# Patient Record
Sex: MALE | Race: WHITE | Employment: FULL TIME | URBAN - METROPOLITAN AREA
[De-identification: names, ages, dates, MRNs, and addresses within clinical notes are randomized per-mention and may not be internally consistent; named-entity substitution may affect disease eponyms.]

---

## 2018-06-01 DIAGNOSIS — F41.9 ANXIETY: ICD-10-CM

## 2018-06-01 RX ORDER — CLONAZEPAM 1 MG/1
TABLET ORAL
Qty: 60 TABLET | Refills: 2 | Status: SHIPPED | OUTPATIENT
Start: 2018-06-01 | End: 2018-08-28 | Stop reason: SDUPTHER

## 2018-08-13 ENCOUNTER — OFFICE VISIT (OUTPATIENT)
Dept: FAMILY MEDICINE CLINIC | Age: 38
End: 2018-08-13
Payer: COMMERCIAL

## 2018-08-13 VITALS
WEIGHT: 217 LBS | DIASTOLIC BLOOD PRESSURE: 80 MMHG | OXYGEN SATURATION: 97 % | TEMPERATURE: 98 F | BODY MASS INDEX: 29.43 KG/M2 | HEART RATE: 84 BPM | SYSTOLIC BLOOD PRESSURE: 128 MMHG

## 2018-08-13 DIAGNOSIS — F17.210 CIGARETTE NICOTINE DEPENDENCE WITHOUT COMPLICATION: ICD-10-CM

## 2018-08-13 DIAGNOSIS — F41.9 ANXIETY: ICD-10-CM

## 2018-08-13 DIAGNOSIS — Z00.00 ENCOUNTER FOR WELL ADULT EXAM WITHOUT ABNORMAL FINDINGS: Primary | ICD-10-CM

## 2018-08-13 PROCEDURE — 99406 BEHAV CHNG SMOKING 3-10 MIN: CPT | Performed by: FAMILY MEDICINE

## 2018-08-13 PROCEDURE — 99214 OFFICE O/P EST MOD 30 MIN: CPT | Performed by: FAMILY MEDICINE

## 2018-08-13 ASSESSMENT — PATIENT HEALTH QUESTIONNAIRE - PHQ9
SUM OF ALL RESPONSES TO PHQ9 QUESTIONS 1 & 2: 0
1. LITTLE INTEREST OR PLEASURE IN DOING THINGS: 0
SUM OF ALL RESPONSES TO PHQ QUESTIONS 1-9: 0
SUM OF ALL RESPONSES TO PHQ QUESTIONS 1-9: 0
2. FEELING DOWN, DEPRESSED OR HOPELESS: 0

## 2018-08-13 NOTE — PROGRESS NOTES
pain, palpitations, anginal symptoms. We discussed multiple things to help him stop smoking including nicotine patches (which she has tried before unsuccessfully), Chantix, Zyban. He is interested in Chantix. He is going to check with his insurance company. We spent greater than 3 but less than 10 minutes today discussing smoking cessation. Family History   Problem Relation Age of Onset    Diabetes Father     High Cholesterol Father        Past Surgical History:   Procedure Laterality Date    ACHILLES TENDON SURGERY      HAND SURGERY      Right 5th metacrpal plate    TONSILLECTOMY AND ADENOIDECTOMY         Social History   Substance Use Topics    Smoking status: Current Every Day Smoker     Packs/day: 0.50     Types: Cigarettes    Smokeless tobacco: Never Used    Alcohol use No   Works as  for "[employer]s. ROS:  No CP, No palpitations,   No sob, No cough,   No abd pain, No heartburn,   No headaches,   No tingling, No numbness, No weakness,   No bowel changes, No hematochezia, No melena,  No bladder changes, No hematuria  No skin rashes, No skin lesions. No vision changes, No hearing changes,   No polyuria, polydipsia, polyphagia. Stable mood. Sometimes anxious. ROS otherwise negative unless as listed in HPI. Chart reviewed and updated where appropriate for PMH, Fam, and Soc Hx. Physical Exam   /80 (Site: Right Arm, Position: Sitting, Cuff Size: Medium Adult)   Pulse 84   Temp 98 °F (36.7 °C) (Oral)   Wt 217 lb (98.4 kg)   SpO2 97%   BMI 29.43 kg/m²   Wt Readings from Last 3 Encounters:   08/13/18 217 lb (98.4 kg)   02/05/18 214 lb (97.1 kg)   07/17/17 218 lb 4.8 oz (99 kg)       Constitutional:    He is oriented to person, place, and time. He appears well-developed and well-nourished.    HENT:    Right Ear: Tympanic membrane, external ear and ear canal normal.    Left Ear: Tympanic membrane, external ear and ear canal normal.    Nose: Nose normal. Mouth/Throat: Oropharynx is clear and moist.   Eyes:    Conjunctivae are normal.    Pupils are equal, round, and reactive to light. EOMI. Neck:    Normal range of motion. No thyromegaly or nodules noted. No bruit. Cardiovascular:    Normal rate, regular rhythm and normal heart sounds. No murmur. No gallop and no friction rub. Pulmonary/Chest:    Effort normal and breath sounds normal.    No wheezes. No rales or rhonchi. Abdominal:    Soft. Bowel sounds are normal.    No distension. No tenderness. Musculoskeletal:    Normal range of motion. No joint swelling noted. No peripheral edema. Skin:    Skin is warm and dry. No rashes, lesions. Psychiatric:    He has a normal mood, sometimes anxious, and affect. Normal groom and dress. Current Outpatient Prescriptions on File Prior to Visit   Medication Sig Dispense Refill    clonazePAM (KLONOPIN) 1 MG tablet take 1 tablet by mouth twice a day if needed for anxiety. . 60 tablet 2    salicylic acid-lactic acid (COMPOUND W) 17 % external solution Apply topically. 9 mL 0    benzoyl peroxide 5 % gel Apply topically daily. 42.5 g 0     No current facility-administered medications on file prior to visit. Patient Active Problem List   Diagnosis Code    Anxiety F41.9       Assessment / Sejal Covert was seen today for annual exam.    Diagnoses and all orders for this visit:    Encounter for well adult exam without abnormal findings  -     CBC Auto Differential; Future  -     Comprehensive Metabolic Panel; Future  -     Lipid Panel; Future    Anxiety  OARRS report reviewed and consistent. As in the HPI. Stable, generally controlled. Continue same Klonopin dose . Currently struggling in his relationship with his wife. He is making some decisions about how he wants to proceed that way. Cigarette nicotine dependence without complication  Greater than 3 but less than 10 minutes was spent discussing nicotine cessation.   He will

## 2018-08-28 DIAGNOSIS — F41.9 ANXIETY: ICD-10-CM

## 2018-08-30 RX ORDER — CLONAZEPAM 1 MG/1
TABLET ORAL
Qty: 60 TABLET | Refills: 2 | Status: SHIPPED | OUTPATIENT
Start: 2018-08-30 | End: 2018-11-15 | Stop reason: CLARIF

## 2018-09-20 ENCOUNTER — TELEPHONE (OUTPATIENT)
Dept: FAMILY MEDICINE CLINIC | Age: 38
End: 2018-09-20

## 2018-11-15 ENCOUNTER — HOSPITAL ENCOUNTER (OUTPATIENT)
Age: 38
Discharge: HOME | End: 2018-11-17
Payer: COMMERCIAL

## 2018-11-15 ENCOUNTER — OFFICE VISIT (OUTPATIENT)
Dept: FAMILY MEDICINE CLINIC | Age: 38
End: 2018-11-15
Payer: COMMERCIAL

## 2018-11-15 VITALS
SYSTOLIC BLOOD PRESSURE: 118 MMHG | BODY MASS INDEX: 30.24 KG/M2 | DIASTOLIC BLOOD PRESSURE: 78 MMHG | OXYGEN SATURATION: 95 % | HEART RATE: 68 BPM | WEIGHT: 223 LBS | TEMPERATURE: 98.4 F

## 2018-11-15 DIAGNOSIS — F41.9 ANXIETY: Primary | ICD-10-CM

## 2018-11-15 LAB
AMPHETAMINE SCREEN, URINE: NOT DETECTED
BARBITURATE SCREEN URINE: NOT DETECTED
BENZODIAZEPINE SCREEN, URINE: NOT DETECTED
CANNABINOID SCREEN URINE: NOT DETECTED
COCAINE METABOLITE SCREEN URINE: NOT DETECTED
METHADONE SCREEN, URINE: NOT DETECTED
OPIATE SCREEN URINE: NOT DETECTED
PHENCYCLIDINE SCREEN URINE: NOT DETECTED
PROPOXYPHENE SCREEN: NOT DETECTED

## 2018-11-15 PROCEDURE — G0480 DRUG TEST DEF 1-7 CLASSES: HCPCS

## 2018-11-15 PROCEDURE — 80307 DRUG TEST PRSMV CHEM ANLYZR: CPT

## 2018-11-15 PROCEDURE — 99213 OFFICE O/P EST LOW 20 MIN: CPT | Performed by: FAMILY MEDICINE

## 2018-11-15 RX ORDER — CLONAZEPAM 1 MG/1
1 TABLET ORAL 2 TIMES DAILY PRN
Refills: 0 | COMMUNITY
Start: 2018-11-01 | End: 2018-11-30 | Stop reason: SDUPTHER

## 2018-11-15 NOTE — PROGRESS NOTES
[facility]  Office Progress Note - Dr. Alexander Muñoz  11/15/18    Chief Complaint   Patient presents with    Anxiety     3 mo f/u        S: Anxiety  Feeling well recently. Work is going ok, but becomes tighter at this time of year. Taking Klonopin 0.5mg QID most recently. Feels like there is less grogginess and more at a constant throughout the day when he takes it like this. He is not feeling an increased need to use it. He is not noting that it is less effective. Urine drug screens have been consistent. Screening test has occasionally needed to have been confirmed by quantitative testing, but medication has been present. OARRS reports have been reviewed and consistent. Not currently available for review today. We discussed issues of tolerance, dependence, and addiction. Patient continues to do well and wishes to continue therapeutic regimen. I think that this is appropriate. We have not increased his dose over time. Continue smoking. Contemplative but not ready to commit. Encouraged him to have some blood work done. He is concerned about cost.      Family History   Problem Relation Age of Onset    Diabetes Father     High Cholesterol Father        Past Surgical History:   Procedure Laterality Date    ACHILLES TENDON SURGERY      HAND SURGERY      Right 5th metacrpal plate    TONSILLECTOMY AND ADENOIDECTOMY         Social History   Substance Use Topics    Smoking status: Current Every Day Smoker     Packs/day: 0.75     Types: Cigarettes    Smokeless tobacco: Never Used    Alcohol use No       ROS:  No CP, No palpitations,   No sob, No cough,   No abd pain, No heartburn,   No headaches,   No tingling, No numbness, No weakness,   No bowel changes, No hematochezia, No melena,  No bladder changes, No hematuria  No skin rashes, No skin lesions. No vision changes, No hearing changes,   No polyuria, polydipsia, polyphagia. Stable mood.   ROS otherwise negative unless as listed months (around 5/15/2019). Patient counseled to follow up sooner or seek more acute care if symptoms worsening. Electronically signed by Remigio Montez MD on 11/15/2018    This note may have been created using dictation software.  Efforts were made to reduce grammatical or syntax errors, but some may persist.

## 2018-11-20 LAB
7-AMINOCLONAZEPAM, URINE: 497 NG/ML
ALPHA-HYDROXYALPRAZOLAM, URINE: <5 NG/ML
ALPHA-HYDROXYMIDAZOLAM, URINE: <20 NG/ML
ALPRAZOLAM, URINE: <5 NG/ML
CHLORDIAZEPOXIDE, URINE: <20 NG/ML
CLONAZEPAM, URINE: 22 NG/ML
DIAZEPAM, URINE: <20 NG/ML
LORAZEPAM, URINE: <20 NG/ML
MIDAZOLAM, URINE: <20 NG/ML
NORDIAZEPAM, URINE: <20 NG/ML
OXAZEPAM, URINE: <20 NG/ML
TEMAZEPAM, URINE: <20 NG/ML

## 2018-11-30 DIAGNOSIS — F41.9 ANXIETY: Primary | ICD-10-CM

## 2018-11-30 RX ORDER — CLONAZEPAM 1 MG/1
1 TABLET ORAL 2 TIMES DAILY PRN
Qty: 60 TABLET | Refills: 0 | Status: SHIPPED | OUTPATIENT
Start: 2018-11-30 | End: 2019-01-03 | Stop reason: SDUPTHER

## 2018-12-10 ENCOUNTER — TELEPHONE (OUTPATIENT)
Dept: FAMILY MEDICINE CLINIC | Age: 38
End: 2018-12-10

## 2019-01-02 DIAGNOSIS — F41.9 ANXIETY: ICD-10-CM

## 2019-01-02 RX ORDER — CLONAZEPAM 1 MG/1
1 TABLET ORAL 2 TIMES DAILY PRN
Qty: 60 TABLET | Refills: 0 | Status: CANCELLED | OUTPATIENT
Start: 2019-01-02 | End: 2019-02-01

## 2019-01-03 DIAGNOSIS — F41.9 ANXIETY: ICD-10-CM

## 2019-01-04 RX ORDER — CLONAZEPAM 1 MG/1
1 TABLET ORAL 2 TIMES DAILY PRN
Qty: 60 TABLET | Refills: 0 | Status: SHIPPED | OUTPATIENT
Start: 2019-01-04 | End: 2019-02-01 | Stop reason: SDUPTHER

## 2019-02-01 DIAGNOSIS — F41.9 ANXIETY: ICD-10-CM

## 2019-02-04 RX ORDER — CLONAZEPAM 1 MG/1
1 TABLET ORAL 2 TIMES DAILY PRN
Qty: 60 TABLET | Refills: 2 | Status: SHIPPED | OUTPATIENT
Start: 2019-02-04 | End: 2019-04-29 | Stop reason: SDUPTHER

## 2019-02-08 ENCOUNTER — TELEPHONE (OUTPATIENT)
Dept: FAMILY MEDICINE CLINIC | Age: 39
End: 2019-02-08

## 2019-03-22 ENCOUNTER — TELEPHONE (OUTPATIENT)
Dept: FAMILY MEDICINE CLINIC | Age: 39
End: 2019-03-22

## 2019-04-18 DIAGNOSIS — F41.9 ANXIETY: ICD-10-CM

## 2019-04-18 RX ORDER — CLONAZEPAM 1 MG/1
1 TABLET ORAL 2 TIMES DAILY PRN
Qty: 60 TABLET | Refills: 2 | Status: CANCELLED | OUTPATIENT
Start: 2019-04-18 | End: 2019-05-18

## 2019-04-29 DIAGNOSIS — F41.9 ANXIETY: ICD-10-CM

## 2019-04-29 RX ORDER — CLONAZEPAM 1 MG/1
1 TABLET ORAL 2 TIMES DAILY PRN
Qty: 60 TABLET | Refills: 2 | Status: SHIPPED | OUTPATIENT
Start: 2019-05-02 | End: 2019-08-02

## 2019-06-12 ENCOUNTER — TELEPHONE (OUTPATIENT)
Dept: FAMILY MEDICINE CLINIC | Age: 39
End: 2019-06-12

## 2019-06-24 ENCOUNTER — TELEPHONE (OUTPATIENT)
Dept: ADMINISTRATIVE | Age: 39
End: 2019-06-24

## 2019-08-02 ENCOUNTER — OFFICE VISIT (OUTPATIENT)
Dept: FAMILY MEDICINE CLINIC | Age: 39
End: 2019-08-02
Payer: COMMERCIAL

## 2019-08-02 ENCOUNTER — HOSPITAL ENCOUNTER (OUTPATIENT)
Age: 39
Discharge: HOME | End: 2019-08-04
Payer: COMMERCIAL

## 2019-08-02 VITALS
HEIGHT: 72 IN | TEMPERATURE: 97.9 F | DIASTOLIC BLOOD PRESSURE: 72 MMHG | OXYGEN SATURATION: 98 % | SYSTOLIC BLOOD PRESSURE: 120 MMHG | BODY MASS INDEX: 29.12 KG/M2 | WEIGHT: 215 LBS | HEART RATE: 79 BPM

## 2019-08-02 DIAGNOSIS — F41.9 ANXIETY: ICD-10-CM

## 2019-08-02 DIAGNOSIS — F41.9 ANXIETY: Primary | ICD-10-CM

## 2019-08-02 PROCEDURE — G0480 DRUG TEST DEF 1-7 CLASSES: HCPCS

## 2019-08-02 PROCEDURE — 80307 DRUG TEST PRSMV CHEM ANLYZR: CPT

## 2019-08-02 PROCEDURE — 99213 OFFICE O/P EST LOW 20 MIN: CPT | Performed by: FAMILY MEDICINE

## 2019-08-02 RX ORDER — CLONAZEPAM 1 MG/1
1 TABLET ORAL 2 TIMES DAILY PRN
Qty: 60 TABLET | Refills: 2 | Status: SHIPPED | OUTPATIENT
Start: 2019-08-02 | End: 2019-10-30 | Stop reason: SDUPTHER

## 2019-08-02 ASSESSMENT — PATIENT HEALTH QUESTIONNAIRE - PHQ9
SUM OF ALL RESPONSES TO PHQ QUESTIONS 1-9: 0
2. FEELING DOWN, DEPRESSED OR HOPELESS: 0
SUM OF ALL RESPONSES TO PHQ QUESTIONS 1-9: 0
SUM OF ALL RESPONSES TO PHQ9 QUESTIONS 1 & 2: 0
1. LITTLE INTEREST OR PLEASURE IN DOING THINGS: 0

## 2019-08-02 NOTE — PROGRESS NOTES
[facility]  Office Progress Note - Dr. Cristiano Leggett  8/2/19    Chief Complaint   Patient presents with    Anxiety    Medication Refill        S: Anx check in  Feeling very well lately  Currently  from wife for about 3 months. Has custody of his son which is also a relief. Work going well, busy. Anxiety has been \"good\" in the beginning with separation was a little rough but doesn't feel like overwhelming. Continues . 5mg in morning, .5 mg noon and 1mg before bedtime. Oarrs report reviewed and consistent. Patient has continued clonazepam 1 mg twice daily for years. Urine drug screens recently consistent. Confirmatory testing has shown residence of appropriate metabolites. Sometimes the screening test is not sensitive enough. Feeing tired more lately  Having some caffeine during the day seems to help, but doesn't want to do that  Sleeps about 8 hours nightly, decently well. Family History   Problem Relation Age of Onset    Diabetes Father     High Cholesterol Father        Past Surgical History:   Procedure Laterality Date    ACHILLES TENDON SURGERY      HAND SURGERY      Right 5th metacrpal plate    TONSILLECTOMY AND ADENOIDECTOMY         Social History     Tobacco Use    Smoking status: Current Every Day Smoker     Packs/day: 0.75     Types: Cigarettes    Smokeless tobacco: Never Used   Substance Use Topics    Alcohol use: No     Alcohol/week: 0.0 standard drinks    Drug use: Not on file       ROS:  No CP, No palpitations,   No sob, No cough,   No abd pain, No heartburn,   No headaches,   No tingling, No numbness, No weakness,   No bowel changes, No hematochezia, No melena,  No bladder changes, No hematuria  No skin rashes, No skin lesions. No vision changes, No hearing changes,   No polyuria, polydipsia, polyphagia. Stable mood. ROS otherwise negative unless as listed in HPI.     Chart reviewed and updated where appropriate for PMH, Fam, and Soc

## 2019-08-08 LAB
7-AMINOCLONAZEPAM, URINE: 273 NG/ML
ALPHA-HYDROXYALPRAZOLAM, URINE: <5 NG/ML
ALPHA-HYDROXYMIDAZOLAM, URINE: <20 NG/ML
ALPRAZOLAM, URINE: <5 NG/ML
CHLORDIAZEPOXIDE, URINE: <20 NG/ML
CLONAZEPAM, URINE: 7 NG/ML
DIAZEPAM, URINE: <20 NG/ML
LORAZEPAM, URINE: <20 NG/ML
MIDAZOLAM, URINE: <20 NG/ML
NORDIAZEPAM, URINE: <20 NG/ML
OXAZEPAM, URINE: <20 NG/ML
TEMAZEPAM, URINE: <20 NG/ML

## 2019-10-29 DIAGNOSIS — F41.9 ANXIETY: ICD-10-CM

## 2019-10-30 RX ORDER — CLONAZEPAM 1 MG/1
1 TABLET ORAL 2 TIMES DAILY PRN
Qty: 60 TABLET | Refills: 2 | Status: SHIPPED | OUTPATIENT
Start: 2019-10-30 | End: 2020-01-24 | Stop reason: SDUPTHER

## 2019-12-13 ENCOUNTER — OFFICE VISIT (OUTPATIENT)
Dept: FAMILY MEDICINE CLINIC | Age: 39
End: 2019-12-13
Payer: COMMERCIAL

## 2019-12-13 VITALS
WEIGHT: 218 LBS | OXYGEN SATURATION: 98 % | TEMPERATURE: 97.4 F | HEIGHT: 72 IN | SYSTOLIC BLOOD PRESSURE: 104 MMHG | DIASTOLIC BLOOD PRESSURE: 60 MMHG | BODY MASS INDEX: 29.53 KG/M2 | HEART RATE: 73 BPM

## 2019-12-13 DIAGNOSIS — F41.9 ANXIETY: Primary | ICD-10-CM

## 2019-12-13 PROCEDURE — 99214 OFFICE O/P EST MOD 30 MIN: CPT | Performed by: FAMILY MEDICINE

## 2020-01-28 RX ORDER — CLONAZEPAM 1 MG/1
1 TABLET ORAL 2 TIMES DAILY PRN
Qty: 60 TABLET | Refills: 2 | Status: SHIPPED
Start: 2020-01-28 | End: 2020-04-16 | Stop reason: SDUPTHER

## 2020-02-20 ENCOUNTER — TELEPHONE (OUTPATIENT)
Dept: FAMILY MEDICINE CLINIC | Age: 40
End: 2020-02-20

## 2020-04-16 ENCOUNTER — TELEPHONE (OUTPATIENT)
Dept: FAMILY MEDICINE CLINIC | Age: 40
End: 2020-04-16

## 2020-04-16 RX ORDER — CLONAZEPAM 1 MG/1
1 TABLET ORAL 2 TIMES DAILY PRN
Qty: 60 TABLET | Refills: 1 | Status: SHIPPED
Start: 2020-04-16 | End: 2020-06-10 | Stop reason: SDUPTHER

## 2020-04-16 NOTE — TELEPHONE ENCOUNTER
Last Appointment:  12/13/2019  Future Appointments   Date Time Provider Coreen Avilesi   6/3/2020  2:40 PM [id number]  He is due to fill on the 25th

## 2020-06-10 ENCOUNTER — OFFICE VISIT (OUTPATIENT)
Dept: FAMILY MEDICINE CLINIC | Age: 40
End: 2020-06-10

## 2020-06-10 ENCOUNTER — HOSPITAL ENCOUNTER (OUTPATIENT)
Age: 40
Discharge: HOME | End: 2020-06-12

## 2020-06-10 VITALS
SYSTOLIC BLOOD PRESSURE: 138 MMHG | HEIGHT: 72 IN | DIASTOLIC BLOOD PRESSURE: 84 MMHG | TEMPERATURE: 98.2 F | BODY MASS INDEX: 30.31 KG/M2 | HEART RATE: 88 BPM | WEIGHT: 223.8 LBS | OXYGEN SATURATION: 98 %

## 2020-06-10 LAB
AMPHETAMINE SCREEN, URINE: NOT DETECTED
BARBITURATE SCREEN URINE: NOT DETECTED
BENZODIAZEPINE SCREEN, URINE: NOT DETECTED
CANNABINOID SCREEN URINE: NOT DETECTED
COCAINE METABOLITE SCREEN URINE: NOT DETECTED
FENTANYL SCREEN, URINE: NOT DETECTED
Lab: NORMAL
METHADONE SCREEN, URINE: NOT DETECTED
OPIATE SCREEN URINE: NOT DETECTED
OXYCODONE URINE: NOT DETECTED
PHENCYCLIDINE SCREEN URINE: NOT DETECTED

## 2020-06-10 PROCEDURE — 80307 DRUG TEST PRSMV CHEM ANLYZR: CPT

## 2020-06-10 PROCEDURE — 99213 OFFICE O/P EST LOW 20 MIN: CPT | Performed by: FAMILY MEDICINE

## 2020-06-10 PROCEDURE — G0480 DRUG TEST DEF 1-7 CLASSES: HCPCS

## 2020-06-10 RX ORDER — CLONAZEPAM 1 MG/1
1 TABLET ORAL 2 TIMES DAILY PRN
Qty: 60 TABLET | Refills: 2 | Status: SHIPPED
Start: 2020-06-10 | End: 2020-09-09

## 2020-06-10 RX ORDER — CLONAZEPAM 1 MG/1
1 TABLET ORAL 2 TIMES DAILY PRN
Qty: 60 TABLET | Refills: 2 | Status: SHIPPED | OUTPATIENT
Start: 2020-06-10 | End: 2020-06-10 | Stop reason: SDUPTHER

## 2020-06-10 ASSESSMENT — PATIENT HEALTH QUESTIONNAIRE - PHQ9
2. FEELING DOWN, DEPRESSED OR HOPELESS: 0
SUM OF ALL RESPONSES TO PHQ9 QUESTIONS 1 & 2: 0
SUM OF ALL RESPONSES TO PHQ QUESTIONS 1-9: 0
1. LITTLE INTEREST OR PLEASURE IN DOING THINGS: 0
SUM OF ALL RESPONSES TO PHQ QUESTIONS 1-9: 0

## 2020-06-10 NOTE — PROGRESS NOTES
[facility]  Office Progress Note - Dr. Patt Andujar  6/10/20    CC:   Chief Complaint   Patient presents with    Anxiety        HPI: anxiety check in  Chronic benzo use. Has worked well for him. Dose has remained stable. OARRS was requested today but not available. Last reviewed 2 months ago. UDS about 1 year ago. Requested today. Consistent 2016 through 2019. Has continued using klonopin same way 0.5 in morning 0.5 lunch 0.5 afternoon and 0.5 in evening. Keep social distancing. 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 Discussed. Noted some rare ectopic beats on exam today. Has been Asx, never noted. Not much caffeine consumption. Sleeping well. Feeling well. No insurance right now. Offered workup vs ekg, vs waiting and watching.   He elects to wait and watch, which I do not think is unreasonable given that he has felt well without any problems or symptoms  ROS neg.   _________________________________________________________  Past Medical History:   Diagnosis Date    Anxiety        Family History   Problem Relation Age of Onset    Diabetes Father     High Cholesterol Father        Past Surgical History:   Procedure Laterality Date    ACHILLES TENDON SURGERY      HAND SURGERY      Right 5th metacrpal plate    TONSILLECTOMY AND ADENOIDECTOMY         Social History     Tobacco Use    Smoking status: Current Every Day Smoker     Packs/day: 0.75     Types: Cigarettes    Smokeless tobacco: Never Used   Substance Use Topics    Alcohol use: No     Alcohol/week: 0.0 standard drinks    Drug use: Not on file     _________________________________________________________  ROS: POSITIVE:  Otherwise:  No CP, No palpitations,   No sob, No cough,   No abd pain, No heartburn,   No headaches, No vision changes, No hearing changes,   No tingling, No numbness, No weakness,   No bowel changes, No hematochezia, No

## 2020-06-13 LAB
7-AMINOCLONAZEPAM, URINE: 542 NG/ML
ALPHA-HYDROXYALPRAZOLAM, URINE: <5 NG/ML
ALPHA-HYDROXYMIDAZOLAM, URINE: <20 NG/ML
ALPRAZOLAM, URINE: <5 NG/ML
CHLORDIAZEPOXIDE, URINE: <20 NG/ML
CLONAZEPAM, URINE: 16 NG/ML
DIAZEPAM, URINE: <20 NG/ML
LORAZEPAM, URINE: <20 NG/ML
MIDAZOLAM, URINE: <20 NG/ML
NORDIAZEPAM, URINE: <20 NG/ML
OXAZEPAM, URINE: <20 NG/ML
TEMAZEPAM, URINE: <20 NG/ML

## 2020-09-10 RX ORDER — CLONAZEPAM 1 MG/1
TABLET ORAL
Qty: 60 TABLET | Refills: 2 | Status: SHIPPED
Start: 2020-09-11 | End: 2020-09-24 | Stop reason: SDUPTHER

## 2020-09-23 ENCOUNTER — TELEPHONE (OUTPATIENT)
Dept: ADMINISTRATIVE | Age: 40
End: 2020-09-23

## 2020-09-24 NOTE — TELEPHONE ENCOUNTER
Last Appointment:  6/10/2020  Future Appointments   Date Time Provider Coreen Avilesi   10/16/2020  3:20 PM Ernie Krishnan  [address]      Per telephone encounter: \"Pt has to be rescheduled due to work conflict. Pt is scheduled to see physician on 10/16, but  will be out of his medication several days before. Could not schedule pt any earlier. \"    Refill on klonopin to [facility] in [place]. Pt states he has enough medication until 10/12.

## 2020-09-26 RX ORDER — CLONAZEPAM 1 MG/1
TABLET ORAL
Qty: 60 TABLET | Refills: 0 | Status: SHIPPED
Start: 2020-10-11 | End: 2020-11-10 | Stop reason: ALTCHOICE

## 2020-10-16 ENCOUNTER — VIRTUAL VISIT (OUTPATIENT)
Dept: FAMILY MEDICINE CLINIC | Age: 40
End: 2020-10-16

## 2020-10-16 PROCEDURE — 99213 OFFICE O/P EST LOW 20 MIN: CPT | Performed by: FAMILY MEDICINE

## 2020-10-16 NOTE — PROGRESS NOTES
10/16/2020    TELEHEALTH EVALUATION -- Audio/Visual (During SXIVC-14 public health emergency)    HPI:    Ted Astorga (:  1980) has requested an audio/video evaluation for the following concern(s): Anxiety  Has been more busy  At work. Likes to stay busy. More tired bot tolerable. No concerns with klonopin. Has been thinking about lowering the dose. Sleeping well. \"Personal life is good. \"  Son is getting older and sleeping in better. 3 yo in January. Review of Systems   Constitutional: Negative for appetite change, chills, fatigue and fever. HENT: Negative. Eyes: Negative. Respiratory: Negative for cough, chest tightness and shortness of breath. Cardiovascular: Negative for chest pain and palpitations. Gastrointestinal: Negative for abdominal pain, constipation, diarrhea, nausea and vomiting. Genitourinary: Negative. Negative for dysuria and frequency. Musculoskeletal: Negative. Skin: Negative. Negative for rash. Neurological: Negative for dizziness, syncope, light-headedness and headaches. Prior to Visit Medications    Medication Sig Taking?  Authorizing Provider   clonazePAM (KLONOPIN) 1 MG tablet take 1 tablet by mouth twice a day if needed for anxiety Yes Stef Peck MD       Social History     Tobacco Use    Smoking status: Current Every Day Smoker     Packs/day: 0.75     Types: Cigarettes    Smokeless tobacco: Never Used   Substance Use Topics    Alcohol use: No     Alcohol/week: 0.0 standard drinks    Drug use: Not on file        No Known Allergies,   Past Medical History:   Diagnosis Date    Anxiety    ,   Past Surgical History:   Procedure Laterality Date    ACHILLES TENDON SURGERY      HAND SURGERY      Right 5th metacrpal plate    TONSILLECTOMY AND ADENOIDECTOMY     ,   Social History     Tobacco Use    Smoking status: Current Every Day Smoker     Packs/day: 0.75     Types: Cigarettes    Smokeless tobacco: Never Used Substance Use Topics    Alcohol use: No     Alcohol/week: 0.0 standard drinks    Drug use: Not on file       PHYSICAL EXAMINATION:  Vital Signs: (As obtained by patient/caregiver or practitioner observation)    Blood pressure-  Heart rate-    Respiratory rate-    Temperature-  Pulse oximetry-     Constitutional: Appears well-developed and well-nourished     No apparent distress             [] Abnormal-   Mental status:      Alert and awake       Oriented to person/place/time      Able to follow commands              [] Abnormal   Eyes:  EOM     Normal         Sclera  Normal               Discharge None visible             [] Abnormal-   HENT:      Normocephalic, atraumatic. Mucous membranes are moist.             [] Abnormal   External Ears:      Normal              [] Abnormal-  Neck:                No visualized mass              [] Abnormal-  Pulmonary/Chest:Respiratory effort normal.           No visualized signs of difficulty breathing or respiratory distress         [] Abnormal-    Musculoskeletal: Normal gait with no signs of ataxia       Normal range of motion of neck         [] Abnormal-   Neurological:   No Facial Asymmetry (Cranial nerve 7 motor function) (limited exam to video visit)      No gaze palsy          [] Abnormal-       Skin:    No significant exanthematous lesions or discoloration noted on facial skin           [] Abnormal-         Psychiatric:  Normal Affect      No Hallucinations          [] Abnormal-       ASSESSMENT/PLAN:  1. Anxiety  Doing well. OARRS reviewed and consistent. Declines problems with tolerance, dep, or misuse. He does have a desire to try to lower the amount used daily. I prompted him, and agree. Will try 0.5mg TID. Has been doing 0.5mg QID for quite a while. Discussed wake, lunch, and dinner doses. Return in about 3 months (around 1/16/2021).     Alysia Merritt is a 45 y.o. male being evaluated by a Virtual Visit (video visit) encounter to address concerns as mentioned above. A caregiver was present when appropriate. Due to this being a TeleHealth encounter ([facility]), evaluation of the following organ systems was limited: Vitals/Constitutional/EENT/Resp/CV/GI//MS/Neuro/Skin/Heme-Lymph-Imm. Pursuant to the emergency declaration under the [address] [place] [address], [address] [place] [address] authority and the [employer] Act, this Virtual Visit was conducted with patient's (and/or legal guardian's) consent, to reduce the patient's risk of exposure to COVID-19 and provide necessary medical care. The patient (and/or legal guardian) has also been advised to contact this office for worsening conditions or problems, and seek emergency medical treatment and/or call 911 if deemed necessary. Patient identification was verified at the start of the visit: Yes    Total time spent on this encounter: Not billed by time    Services were provided through a video synchronous discussion virtually to substitute for in-person clinic visit. Patient and provider were located at their individual homes. --Jaya Wei MD on 10/17/2020 at 11:22 AM    An electronic signature was used to authenticate this note.

## 2020-10-17 ASSESSMENT — ENCOUNTER SYMPTOMS
DIARRHEA: 0
SHORTNESS OF BREATH: 0
COUGH: 0
EYES NEGATIVE: 1
ABDOMINAL PAIN: 0
VOMITING: 0
CONSTIPATION: 0
NAUSEA: 0
CHEST TIGHTNESS: 0

## 2020-11-09 RX ORDER — CLONAZEPAM 0.5 MG/1
0.5 TABLET ORAL 2 TIMES DAILY PRN
COMMUNITY
End: 2020-11-09 | Stop reason: SDUPTHER

## 2020-11-09 NOTE — TELEPHONE ENCOUNTER
Patient calling in and is requesting refill.  States during last visit you discussed taking 0.5 mg. Pended as 0.5 mg.

## 2020-11-10 RX ORDER — CLONAZEPAM 0.5 MG/1
0.5 TABLET ORAL 3 TIMES DAILY PRN
Qty: 90 TABLET | Refills: 2 | Status: SHIPPED
Start: 2020-11-10 | End: 2021-02-04 | Stop reason: SDUPTHER

## 2021-01-18 ENCOUNTER — VIRTUAL VISIT (OUTPATIENT)
Dept: FAMILY MEDICINE CLINIC | Age: 41
End: 2021-01-18

## 2021-01-18 DIAGNOSIS — F41.9 ANXIETY: Primary | ICD-10-CM

## 2021-01-18 PROCEDURE — 99213 OFFICE O/P EST LOW 20 MIN: CPT | Performed by: FAMILY MEDICINE

## 2021-01-18 ASSESSMENT — PATIENT HEALTH QUESTIONNAIRE - PHQ9
SUM OF ALL RESPONSES TO PHQ QUESTIONS 1-9: 0
1. LITTLE INTEREST OR PLEASURE IN DOING THINGS: 0
SUM OF ALL RESPONSES TO PHQ QUESTIONS 1-9: 0

## 2021-01-18 ASSESSMENT — ENCOUNTER SYMPTOMS
NAUSEA: 0
CONSTIPATION: 0
EYES NEGATIVE: 1
VOMITING: 0
ABDOMINAL PAIN: 0
COUGH: 0
CHEST TIGHTNESS: 0
DIARRHEA: 0
SHORTNESS OF BREATH: 0

## 2021-01-18 NOTE — PROGRESS NOTES
2021    TELEHEALTH EVALUATION -- Audio/Visual (During YBFQJ-28 public health emergency)    HPI:    Rommel Kumar (:  1980) has requested an audio/video evaluation for the following concern(s): Anxiety 3 month check in  We did wean the dose down last appt. Had a handful of days where more difficult in the beginning, but more recently hs bee easier. Dec from 0.5 mg QID to 0.5mg TID. Denies any inc irritability or trouble falling asleep since dec the dose. Little bit of a panic feeling at first, but no true panic attacks     Covid vaccine counseling. Hx took xanax for a very lnog time historically and klonopin feels more even, less ups and downs, less feeling of panic setting in. Review of Systems   Constitutional: Negative for appetite change, chills, fatigue and fever. HENT: Negative. Eyes: Negative. Respiratory: Negative for cough, chest tightness and shortness of breath. Cardiovascular: Negative for chest pain and palpitations. Gastrointestinal: Negative for abdominal pain, constipation, diarrhea, nausea and vomiting. Genitourinary: Negative. Negative for dysuria and frequency. Musculoskeletal: Negative. Skin: Negative. Negative for rash. Neurological: Negative for dizziness, syncope, light-headedness and headaches. Prior to Visit Medications    Medication Sig Taking? Authorizing Provider   clonazePAM (KLONOPIN) 0.5 MG tablet Take 1 tablet by mouth 3 times daily as needed for Anxiety for up to 90 days.  Yes Colette Villalobos MD       Social History     Tobacco Use    Smoking status: Current Every Day Smoker     Packs/day: 0.75     Types: Cigarettes    Smokeless tobacco: Never Used   Substance Use Topics    Alcohol use: No     Alcohol/week: 0.0 standard drinks    Drug use: Not on file        No Known Allergies,   Past Medical History:   Diagnosis Date    Anxiety    ,   Past Surgical History:   Procedure Laterality Date Sandeep Restrepo is a 45 y.o. male being evaluated by a Virtual Visit (video visit) encounter to address concerns as mentioned above. A caregiver was present when appropriate. Due to this being a TeleHealth encounter (During [facility] emergency), evaluation of the following organ systems was limited: Vitals/Constitutional/EENT/Resp/CV/GI//MS/Neuro/Skin/Heme-Lymph-Imm. Pursuant to the emergency declaration under the [address] [place] [address] and the [employer] Act, this Virtual Visit was conducted with patient's (and/or legal guardian's) consent, to reduce the patient's risk of exposure to COVID-19 and provide necessary medical care. The patient (and/or legal guardian) has also been advised to contact this office for worsening conditions or problems, and seek emergency medical treatment and/or call 911 if deemed necessary. Patient identification was verified at the start of the visit: Yes    Total time spent on this encounter: Not billed by time    Services were provided through a video synchronous discussion virtually to substitute for in-person clinic visit. Patient and provider were located at their individual homes. --Nuris Phan MD on 1/19/2021 at 9:02 AM    An electronic signature was used to authenticate this note.

## 2021-02-03 DIAGNOSIS — F41.9 ANXIETY: ICD-10-CM

## 2021-02-03 NOTE — TELEPHONE ENCOUNTER
Name of Medication(s) Requested:  [facility]    Pharmacy Requested:   [facility]    Medication(s) pended? [x] Yes  [] No    Last Appointment:  1/18/2021    Future appts:  No future appointments. Does patient need call back?   [] Yes  [x] No

## 2021-02-04 RX ORDER — CLONAZEPAM 0.5 MG/1
0.5 TABLET ORAL 3 TIMES DAILY PRN
Qty: 90 TABLET | Refills: 2 | Status: SHIPPED
Start: 2021-02-04 | End: 2021-05-03 | Stop reason: SDUPTHER

## 2021-05-03 DIAGNOSIS — F41.9 ANXIETY: ICD-10-CM

## 2021-05-03 RX ORDER — CLONAZEPAM 0.5 MG/1
0.5 TABLET ORAL 3 TIMES DAILY PRN
Qty: 90 TABLET | Refills: 0 | Status: SHIPPED
Start: 2021-05-03 | End: 2021-06-02 | Stop reason: SDUPTHER

## 2021-05-03 NOTE — TELEPHONE ENCOUNTER
Name of Medication(s) Requested:  Clonazepam    Pharmacy Requested:   [facility]    Medication(s) pended? [x] Yes  [] No    Last Appointment:  1/18/2021    Future appts:  No future appointments. Does patient need call back?   [] Yes  [x] No

## 2021-05-13 ENCOUNTER — VIRTUAL VISIT (OUTPATIENT)
Dept: FAMILY MEDICINE CLINIC | Age: 41
End: 2021-05-13
Payer: COMMERCIAL

## 2021-05-13 ENCOUNTER — TELEPHONE (OUTPATIENT)
Dept: FAMILY MEDICINE CLINIC | Age: 41
End: 2021-05-13

## 2021-05-13 DIAGNOSIS — F41.9 ANXIETY: Primary | ICD-10-CM

## 2021-05-13 PROCEDURE — 99213 OFFICE O/P EST LOW 20 MIN: CPT | Performed by: FAMILY MEDICINE

## 2021-05-13 ASSESSMENT — ENCOUNTER SYMPTOMS
SHORTNESS OF BREATH: 0
VOMITING: 0
CHEST TIGHTNESS: 0
ABDOMINAL PAIN: 0
EYES NEGATIVE: 1
NAUSEA: 0
COUGH: 0
DIARRHEA: 0
CONSTIPATION: 0

## 2021-05-13 NOTE — TELEPHONE ENCOUNTER
VM left requesting return call. Pt needs scheduled for 3 mo f/u (in person) visit with Dr. Amna Del Rio.

## 2021-05-13 NOTE — PROGRESS NOTES
2021    TELEHEALTH EVALUATION -- Audio/Visual (During [id number]-81 public health emergency)    HPI:    Johanna Sy (:  1980) has requested an audio/video evaluation for the following concern(s): Anxiety  Has bene very good, no problems. Slight panic, here and there - more often later at night. Has helped his work day remain manageable  Lowered daily dose about 6 months ago and has done well. Balanced out now. Sleep has been good. No problems. When would have panic, mind racing / \"worry wort\" type things. Thinking of the future. Some SOB, heart beating faster. Episode might last for a hour or so until his morning dose of klonopin kicks in. Long term he does have goal of trying to get off the medicine  Suspect hereditary as he sees his father having trouble wth this as he gets older. Job changed a little  - back as an employee - little more benefits      Review of Systems   Constitutional: Negative for appetite change, chills, fatigue and fever. HENT: Negative. Eyes: Negative. Respiratory: Negative for cough, chest tightness and shortness of breath. Cardiovascular: Negative for chest pain and palpitations. Gastrointestinal: Negative for abdominal pain, constipation, diarrhea, nausea and vomiting. Genitourinary: Negative. Negative for dysuria and frequency. Musculoskeletal: Negative. Skin: Negative. Negative for rash. Neurological: Negative for dizziness, syncope, light-headedness and headaches. Psychiatric/Behavioral: Negative for agitation, behavioral problems, confusion, decreased concentration, dysphoric mood and suicidal ideas. The patient is nervous/anxious. Prior to Visit Medications    Medication Sig Taking? Authorizing Provider   clonazePAM (KLONOPIN) 0.5 MG tablet Take 1 tablet by mouth 3 times daily as needed for Anxiety for up to 90 days.  Yes Loida Lara MD       Social History     Tobacco Use    Smoking status: Current Every Day Smoker     Packs/day: 0.75     Types: Cigarettes    Smokeless tobacco: Never Used   Substance Use Topics    Alcohol use: No     Alcohol/week: 0.0 standard drinks    Drug use: Not on file        No Known Allergies,   Past Medical History:   Diagnosis Date    Anxiety    ,   Past Surgical History:   Procedure Laterality Date    ACHILLES TENDON SURGERY      HAND SURGERY      Right 5th metacrpal plate    TONSILLECTOMY AND ADENOIDECTOMY     ,   Social History     Tobacco Use    Smoking status: Current Every Day Smoker     Packs/day: 0.75     Types: Cigarettes    Smokeless tobacco: Never Used   Substance Use Topics    Alcohol use: No     Alcohol/week: 0.0 standard drinks    Drug use: Not on file       PHYSICAL EXAMINATION:  Vital Signs: (As obtained by patient/caregiver or practitioner observation)    Blood pressure-  Heart rate-    Respiratory rate-    Temperature-  Pulse oximetry-     Constitutional: Appears well-developed and well-nourished     No apparent distress             [] Abnormal-   Mental status:      Alert and awake       Oriented to person/place/time      Able to follow commands              [] Abnormal   Eyes:  EOM     Normal         Sclera  Normal               Discharge None visible             [] Abnormal-   HENT:      Normocephalic, atraumatic.     Mucous membranes are moist.             [] Abnormal   External Ears:      Normal              [] Abnormal-  Neck:                No visualized mass              [] Abnormal-  Pulmonary/Chest:Respiratory effort normal.           No visualized signs of difficulty breathing or respiratory distress         [] Abnormal-    Musculoskeletal: Normal gait with no signs of ataxia       Normal range of motion of neck         [] Abnormal-   Neurological:   No Facial Asymmetry (Cranial nerve 7 motor function) (limited exam to video visit)      No gaze palsy          [] Abnormal-       Skin:    No significant exanthematous lesions or discoloration noted on facial skin           [] Abnormal-         Psychiatric:  Normal Affect      No Hallucinations          [] Abnormal-       ASSESSMENT/PLAN:  1. Anxiety  Stable. Well-controlled. He desires to work on decreasing the [place] 13 in the future. We did discuss possibly overlapping with an SSRI likely wean further. He is  from his wife and is feeling some relief about that. He does not want to make any medication changes right now. No apparent problems with tolerance, dependence, misuse. OARRS report reviewed and consistent. Urine drug screen present within the past year and consistent. 3 months    Gume Pickering, was evaluated through a synchronous (real-time) audio-video encounter. The patient (or guardian if applicable) is aware that this is a billable service. Verbal consent to proceed has been obtained within the past 12 months. The visit was conducted pursuant to the emergency declaration under the [facility], [address] [place] [address] authority and the [employer] General Act. Patient identification was verified, and a caregiver was present when appropriate. The patient was located in a state where the provider was credentialed to provide care. Total time spent on this encounter: Not billed by time    --Tal Evans MD on 5/13/2021 at 5:53 PM    An electronic signature was used to authenticate this note.

## 2021-06-02 DIAGNOSIS — F41.9 ANXIETY: ICD-10-CM

## 2021-06-02 RX ORDER — CLONAZEPAM 0.5 MG/1
0.5 TABLET ORAL 3 TIMES DAILY PRN
Qty: 90 TABLET | Refills: 2 | Status: SHIPPED
Start: 2021-06-02 | End: 2021-08-21 | Stop reason: SDUPTHER

## 2021-06-02 NOTE — TELEPHONE ENCOUNTER
Last Appointment:  5/13/2021  Future Appointments   Date Time Provider Coreen Celeste   8/16/2021  3:00 PM MD [facility]       Refill requested

## 2021-08-20 ENCOUNTER — VIRTUAL VISIT (OUTPATIENT)
Dept: FAMILY MEDICINE CLINIC | Age: 41
End: 2021-08-20
Payer: COMMERCIAL

## 2021-08-20 ENCOUNTER — TELEPHONE (OUTPATIENT)
Dept: FAMILY MEDICINE CLINIC | Age: 41
End: 2021-08-20

## 2021-08-20 DIAGNOSIS — F41.9 ANXIETY: ICD-10-CM

## 2021-08-20 PROCEDURE — 99213 OFFICE O/P EST LOW 20 MIN: CPT | Performed by: FAMILY MEDICINE

## 2021-08-20 NOTE — PROGRESS NOTES
2021    TELEHEALTH EVALUATION -- Audio/Visual (During [facility] public health emergency)    HPI:    Esther Cormier (:  1980) has requested an audio/video evaluation for the following concern(s): Anxiety  Has been controlled, steady  Going through a divorce. Has been parenting more often as wife has been needing some   He has goal of weaning to 0.5mg Klonopin BID from current TID. Has a few left over this month trying to work down to that BID dosing. No sleep problems, tolerance, dependence symptoms, or misuse identified. OARRS reviewed and consistent. Review of Systems   Constitutional: Negative for appetite change, chills, fatigue and fever. HENT: Negative. Eyes: Negative. Respiratory: Negative for cough, chest tightness and shortness of breath. Cardiovascular: Negative for chest pain and palpitations. Gastrointestinal: Negative for abdominal pain, constipation, diarrhea, nausea and vomiting. Genitourinary: Negative. Negative for dysuria and frequency. Musculoskeletal: Negative. Skin: Negative. Negative for rash. Neurological: Negative for dizziness, syncope, light-headedness and headaches. Prior to Visit Medications    Medication Sig Taking? Authorizing Provider   clonazePAM (KLONOPIN) 0.5 MG tablet Take 1 tablet by mouth 3 times daily as needed for Anxiety for up to 90 days.  Yes Tahir Crum MD       Social History     Tobacco Use    Smoking status: Current Every Day Smoker     Packs/day: 0.75     Types: Cigarettes    Smokeless tobacco: Never Used   Substance Use Topics    Alcohol use: No     Alcohol/week: 0.0 standard drinks    Drug use: Not on file            PHYSICAL EXAMINATION:  Vital Signs: (As obtained by patient/caregiver or practitioner observation)    Blood pressure-  Heart rate-    Respiratory rate-    Temperature-  Pulse oximetry-     Constitutional: Appears well-developed and well-nourished     No apparent distress             [] Abnormal-   Mental status:      Alert and awake       Oriented to person/place/time      Able to follow commands              [] Abnormal   Eyes:  EOM     Normal         Sclera  Normal               Discharge None visible             [] Abnormal-   HENT:      Normocephalic, atraumatic. Mucous membranes are moist.             [] Abnormal   External Ears:      Normal              [] Abnormal-  Neck:                No visualized mass              [] Abnormal-  Pulmonary/Chest:Respiratory effort normal.           No visualized signs of difficulty breathing or respiratory distress         [] Abnormal-    Musculoskeletal: Normal gait with no signs of ataxia       Normal range of motion of neck         [] Abnormal-   Neurological:   No Facial Asymmetry (Cranial nerve 7 motor function) (limited exam to video visit)      No gaze palsy          [] Abnormal-       Skin:    No significant exanthematous lesions or discoloration noted on facial skin           [] Abnormal-         Psychiatric:  Normal Affect      No Hallucinations          [] Abnormal-       ASSESSMENT/PLAN:  1. Anxiety  Continue klonopin. Working for him. Not causing problems. Goal to wean to BID dosing over next 3 months. - clonazePAM (KLONOPIN) 0.5 MG tablet; Take 1 tablet by mouth 3 times daily as needed for Anxiety for up to 90 days. Dispense: 90 tablet; Refill: 2    FU 3 mos. [facility], was evaluated through a synchronous (real-time) audio-video encounter. The patient (or guardian if applicable) is aware that this is a billable service. Verbal consent to proceed has been obtained within the past 12 months. The visit was conducted pursuant to the emergency declaration under the [address] [place] [address] and the [employer] General Act. Patient identification was verified, and a caregiver was present when appropriate.  The patient was located in a

## 2021-08-20 NOTE — TELEPHONE ENCOUNTER
Voicemail left for patient to return our call at their earliest convenience. Pt needs 3 mo f/u scheduled with doctor. Can be virtual or in person.  Up to patient

## 2021-08-21 RX ORDER — CLONAZEPAM 0.5 MG/1
0.5 TABLET ORAL 3 TIMES DAILY PRN
Qty: 90 TABLET | Refills: 2 | Status: SHIPPED
Start: 2021-08-21 | End: 2021-11-22 | Stop reason: SDUPTHER

## 2021-08-21 ASSESSMENT — ENCOUNTER SYMPTOMS
COUGH: 0
CONSTIPATION: 0
ABDOMINAL PAIN: 0
EYES NEGATIVE: 1
CHEST TIGHTNESS: 0
SHORTNESS OF BREATH: 0
DIARRHEA: 0
VOMITING: 0
NAUSEA: 0

## 2021-11-22 ENCOUNTER — OFFICE VISIT (OUTPATIENT)
Dept: FAMILY MEDICINE CLINIC | Age: 41
End: 2021-11-22
Payer: COMMERCIAL

## 2021-11-22 VITALS
WEIGHT: 234 LBS | TEMPERATURE: 97.5 F | HEIGHT: 72 IN | BODY MASS INDEX: 31.69 KG/M2 | OXYGEN SATURATION: 99 % | DIASTOLIC BLOOD PRESSURE: 86 MMHG | HEART RATE: 88 BPM | SYSTOLIC BLOOD PRESSURE: 124 MMHG

## 2021-11-22 DIAGNOSIS — Z00.00 ENCOUNTER FOR WELL ADULT EXAM WITHOUT ABNORMAL FINDINGS: ICD-10-CM

## 2021-11-22 DIAGNOSIS — Z00.00 ENCOUNTER FOR WELL ADULT EXAM WITHOUT ABNORMAL FINDINGS: Primary | ICD-10-CM

## 2021-11-22 DIAGNOSIS — F41.9 ANXIETY: ICD-10-CM

## 2021-11-22 LAB
ALBUMIN SERPL-MCNC: 4.9 G/DL (ref 3.5–5.2)
ALP BLD-CCNC: 64 U/L (ref 40–129)
ALT SERPL-CCNC: 119 U/L (ref 0–40)
AMPHETAMINE SCREEN, URINE: NOT DETECTED
ANION GAP SERPL CALCULATED.3IONS-SCNC: 17 MMOL/L (ref 7–16)
AST SERPL-CCNC: 62 U/L (ref 0–39)
BARBITURATE SCREEN URINE: NOT DETECTED
BASOPHILS ABSOLUTE: 0.05 E9/L (ref 0–0.2)
BASOPHILS RELATIVE PERCENT: 0.6 % (ref 0–2)
BENZODIAZEPINE SCREEN, URINE: NOT DETECTED
BILIRUB SERPL-MCNC: 0.5 MG/DL (ref 0–1.2)
BUN BLDV-MCNC: 13 MG/DL (ref 6–20)
CALCIUM SERPL-MCNC: 10.3 MG/DL (ref 8.6–10.2)
CANNABINOID SCREEN URINE: NOT DETECTED
CHLORIDE BLD-SCNC: 103 MMOL/L (ref 98–107)
CHOLESTEROL, TOTAL: 214 MG/DL (ref 0–199)
CO2: 24 MMOL/L (ref 22–29)
COCAINE METABOLITE SCREEN URINE: NOT DETECTED
CREAT SERPL-MCNC: 1.1 MG/DL (ref 0.7–1.2)
EOSINOPHILS ABSOLUTE: 0.17 E9/L (ref 0.05–0.5)
EOSINOPHILS RELATIVE PERCENT: 2.1 % (ref 0–6)
FENTANYL SCREEN, URINE: NOT DETECTED
GFR AFRICAN AMERICAN: >60
GFR NON-AFRICAN AMERICAN: >60 ML/MIN/1.73
GLUCOSE BLD-MCNC: 89 MG/DL (ref 74–99)
HCT VFR BLD CALC: 43.5 % (ref 37–54)
HDLC SERPL-MCNC: 34 MG/DL
HEMOGLOBIN: 14.7 G/DL (ref 12.5–16.5)
IMMATURE GRANULOCYTES #: 0.04 E9/L
IMMATURE GRANULOCYTES %: 0.5 % (ref 0–5)
LDL CHOLESTEROL CALCULATED: 118 MG/DL (ref 0–99)
LYMPHOCYTES ABSOLUTE: 2.4 E9/L (ref 1.5–4)
LYMPHOCYTES RELATIVE PERCENT: 29.3 % (ref 20–42)
Lab: NORMAL
MCH RBC QN AUTO: 31.5 PG (ref 26–35)
MCHC RBC AUTO-ENTMCNC: 33.8 % (ref 32–34.5)
MCV RBC AUTO: 93.1 FL (ref 80–99.9)
METHADONE SCREEN, URINE: NOT DETECTED
MONOCYTES ABSOLUTE: 0.76 E9/L (ref 0.1–0.95)
MONOCYTES RELATIVE PERCENT: 9.3 % (ref 2–12)
NEUTROPHILS ABSOLUTE: 4.76 E9/L (ref 1.8–7.3)
NEUTROPHILS RELATIVE PERCENT: 58.2 % (ref 43–80)
OPIATE SCREEN URINE: NOT DETECTED
OXYCODONE URINE: NOT DETECTED
PDW BLD-RTO: 12 FL (ref 11.5–15)
PHENCYCLIDINE SCREEN URINE: NOT DETECTED
PLATELET # BLD: 260 E9/L (ref 130–450)
PMV BLD AUTO: 10.1 FL (ref 7–12)
POTASSIUM SERPL-SCNC: 3.8 MMOL/L (ref 3.5–5)
RBC # BLD: 4.67 E12/L (ref 3.8–5.8)
SODIUM BLD-SCNC: 144 MMOL/L (ref 132–146)
TOTAL PROTEIN: 7.9 G/DL (ref 6.4–8.3)
TRIGL SERPL-MCNC: 309 MG/DL (ref 0–149)
VLDLC SERPL CALC-MCNC: 62 MG/DL
WBC # BLD: 8.2 E9/L (ref 4.5–11.5)

## 2021-11-22 PROCEDURE — 99396 PREV VISIT EST AGE 40-64: CPT | Performed by: FAMILY MEDICINE

## 2021-11-22 RX ORDER — CLONAZEPAM 0.5 MG/1
0.5 TABLET ORAL 3 TIMES DAILY PRN
Qty: 90 TABLET | Refills: 2 | Status: SHIPPED
Start: 2021-11-22 | End: 2022-02-28 | Stop reason: SDUPTHER

## 2021-11-22 NOTE — PROGRESS NOTES
[facility]  Office Progress Note - Dr. Krystyna Guido  11/22/21    CC:   Chief Complaint   Patient presents with    Anxiety        /86 (Site: Left Upper Arm, Position: Sitting, Cuff Size: Large Adult)   Pulse 88   Temp 97.5 °F (36.4 °C) (Temporal)   Ht 6' (1.829 m)   Wt 234 lb (106.1 kg)   SpO2 99%   BMI 31.74 kg/m²   Wt Readings from Last 3 Encounters:   11/22/21 234 lb (106.1 kg)   06/10/20 223 lb 12.8 oz (101.5 kg)   12/13/19 218 lb (98.9 kg)     HPI:   Patient presents for yearly physical.     Overall they are doing well. Concerns: anxiety  3 mos check in for klonopin  Had discussed maybe dec  Dose/freqncy at his last appt. Father with a Hx of anxiety / panic attacks as well. Sometimes father calls patient needing some help to calm down. Has continues to cut down toward BID dose - maybe 10 days out of the month he was able to do that. On those nights it is often more difficult to fall asleep. occ notes inc panic about daily activities or things that will happen in the future. Getting into his normal daily activity tends to help. Used to take xanax since about 27 yo.  hasnt     Has bene noting inc amount of lumps under the skin  Right axilla has been bothering him some recently  Maybe 5 months. Not growing. On exam he has a flat oval shaped mass about 2 cm diameter in the right axilla that is mobile and feels like a lipoma. He has 2 smaller masses more distal about 4 cm away each that also feel like lipomas. Weight reviewed. Body mass index is 31.74 kg/m². Wt Readings from Last 3 Encounters:   11/22/21 234 lb (106.1 kg)   06/10/20 223 lb 12.8 oz (101.5 kg)   12/13/19 218 lb (98.9 kg)     Patient is currently working on diet. Patient is currently working on exercise. Vaccines reviewed. Discussed age appropriate vaccine recommendations. HM Reviewed. Discussed age appropriate HM topics. Patient was encouraged to update HM topics.  Ordered where agreeable. Reviewed age appropriate anticipatory guidance. Recent labs reviewed include: None, ordered today though    The ASCVD Risk score (Wilder Pimentel, et al., 2013) failed to calculate for the following reasons:    Cannot find a previous HDL lab    Cannot find a previous total cholesterol lab    PMH, FH, SxHx, Social Hx reviewed and updated if needed. Offered available vaccines including tetanus, pneumonia, Covid, influenza. Patient had varicella as a child.    _________________________________________________________    Assessment / Priyanka Jaimie was seen today for anxiety. Diagnoses and all orders for this visit:    Encounter for well adult exam without abnormal findings  -     CBC Auto Differential; Future  -     Comprehensive Metabolic Panel; Future  -     Lipid Panel; Future  Overall Rubi Loge is doing well. Age appropriate HM topics reviewed and updated where agreeable. Vaccines reviewed and updated where agreeable. Age appropriate anticipatory guidance discussed. Encouraged to work on [employer] and exercise strategies. Opportunity to ask questions and answers provided as able. Declines vaccines but was counseled today on what is available. Colon cancer screening starting at age 39. Update labs. Recommend RTO in 1 year for annual physical.     Anxiety, stable  -     clonazePAM (KLONOPIN) 0.5 MG tablet; Take 1 tablet by mouth 3 times daily as needed for Anxiety for up to 90 days. -     Urine Drug Screen; Future  Weaning Klonopin use as able. Has cut back on alcohol consumption most recently. Usually would drink moderate to heavily on Fridays, and has not done out the past 3 weeks with an intention to continue. As such we will hold Klonopin dose the same for now and continue working on lowering the dose over time as able. Return in about 3 months (around 2/22/2022). or as scheduled.    Patient counseled to follow up sooner or seek more acute care if symptoms worsening or not improving according to plan. Electronically signed by Meseret Joseph MD on 11/22/2021    _________________________________________________________  No current outpatient medications on file prior to visit. No current facility-administered medications on file prior to visit. Patient Active Problem List   Diagnosis Code    Anxiety F41.9     _________________________________________________________  Past Medical History:   Diagnosis Date    Anxiety        Family History   Problem Relation Age of Onset    Diabetes Father     High Cholesterol Father        Past Surgical History:   Procedure Laterality Date    ACHILLES TENDON SURGERY      HAND SURGERY      Right 5th metacrpal plate    TONSILLECTOMY AND ADENOIDECTOMY         Social History     Tobacco Use    Smoking status: Current Every Day Smoker     Packs/day: 0.50     Types: Cigarettes    Smokeless tobacco: Never Used   Substance Use Topics    Alcohol use: No     Alcohol/week: 0.0 standard drinks    Drug use: Not on file       Chart reviewed and updated where appropriate for PMH, Fam, and Soc Hx.  _________________________________________________________  ROS: POSITIVE: As in the HPI. Otherwise Pertinent negatives are negative.    __________________________________________________________  Physical Exam   Constitutional:    He is oriented to person, place, and time. He appears well-developed and well-nourished. HENT:    Right Ear: normal pinna, normal canal, normal TM. Left Ear: normal pinna, normal canal, normal TM. Nose: Nose normal.    Mouth/Throat: Oropharynx is clear and moist.   Eyes:    Conjunctivae are normal.    Pupils are equal, round, and reactive to light. EOMI. Neck:    Normal range of motion. No thyromegaly or nodules noted. No bruit. No LAD. Cardiovascular:    Normal rate, regular rhythm and normal heart sounds. No murmur. No gallop and no friction rub.    Pulmonary/Chest:    Effort normal and breath sounds normal.    No wheezes. No rales or rhonchi. Abdominal:    Soft. Bowel sounds are normal.    No distension. No tenderness. Musculoskeletal:    Normal range of motion. No joint swelling noted. No peripheral edema. Neurological:    He is A&Ox3. Motor and sensation grossly intact. Normal Gait. Skin:    Skin is warm and dry. No rashes, lesions. Mass noted in the right axilla as described in the HPI, likely lipoma. Psychiatric:    He has a normal mood and affect. Normal groom and dress. No SI or HI.   ________________________________________________________    This note may have been created using dictation software.  Efforts were made to reduce errors, but some may persist.

## 2021-11-23 DIAGNOSIS — R79.89 ELEVATED LFTS: Primary | ICD-10-CM

## 2022-02-28 ENCOUNTER — TELEMEDICINE (OUTPATIENT)
Dept: FAMILY MEDICINE CLINIC | Age: 42
End: 2022-02-28
Payer: COMMERCIAL

## 2022-02-28 DIAGNOSIS — F41.9 ANXIETY: ICD-10-CM

## 2022-02-28 PROCEDURE — 99213 OFFICE O/P EST LOW 20 MIN: CPT | Performed by: FAMILY MEDICINE

## 2022-02-28 RX ORDER — CLONAZEPAM 0.5 MG/1
0.5 TABLET ORAL 2 TIMES DAILY PRN
Qty: 60 TABLET | Refills: 2 | Status: SHIPPED
Start: 2022-02-28 | End: 2022-07-18 | Stop reason: SDUPTHER

## 2022-02-28 ASSESSMENT — PATIENT HEALTH QUESTIONNAIRE - PHQ9
SUM OF ALL RESPONSES TO PHQ QUESTIONS 1-9: 0
SUM OF ALL RESPONSES TO PHQ9 QUESTIONS 1 & 2: 0
SUM OF ALL RESPONSES TO PHQ QUESTIONS 1-9: 0
1. LITTLE INTEREST OR PLEASURE IN DOING THINGS: 0
SUM OF ALL RESPONSES TO PHQ QUESTIONS 1-9: 0
SUM OF ALL RESPONSES TO PHQ QUESTIONS 1-9: 0
2. FEELING DOWN, DEPRESSED OR HOPELESS: 0

## 2022-02-28 ASSESSMENT — ENCOUNTER SYMPTOMS
CONSTIPATION: 0
SHORTNESS OF BREATH: 0
DIARRHEA: 0
NAUSEA: 0
VOMITING: 0
COUGH: 0
EYES NEGATIVE: 1
ABDOMINAL PAIN: 0
CHEST TIGHTNESS: 0

## 2022-02-28 NOTE — PROGRESS NOTES
2022    TELEHEALTH EVALUATION -- Audio/Visual (During QAJNY-22 public health emergency)    HPI:    Mary Armas (:  1980) has requested an audio/video evaluation for the following concern(s):    HPI: Anxiety   Has been doing well  Has been trying to cut back on klonopin. Down to two tabs daily on most days. Maybe 2/7 days will take a third. Mostly single parenting his son. Currently trying to get emergency custody as he reports that his ex-wife had an overdose and hospital admission this weekend. He feels like he is coping with that stress well. Cut back on drinking significantly. We talked about this at his last appointment. Probably only been drinking once on . Otherwise sober. Denies any problems with the Klonopin. Not overly sedated. Has been weaning the dose down slowly over time. Historically he was taking about 4 mg of Xanax daily in [place] years ago. OARRS report reviewed and consistent. Review of Systems   Constitutional: Negative for appetite change, chills, fatigue and fever. HENT: Negative. Eyes: Negative. Respiratory: Negative for cough, chest tightness and shortness of breath. Cardiovascular: Negative for chest pain and palpitations. Gastrointestinal: Negative for abdominal pain, constipation, diarrhea, nausea and vomiting. Genitourinary: Negative. Negative for dysuria and frequency. Musculoskeletal: Negative. Skin: Negative. Negative for rash. Neurological: Negative for dizziness, syncope, light-headedness and headaches. Psychiatric/Behavioral: Negative for agitation, behavioral problems, decreased concentration, dysphoric mood and suicidal ideas. Confusion: .vvexam. The patient is not nervous/anxious and is not hyperactive. Prior to Visit Medications    Medication Sig Taking? Authorizing Provider   clonazePAM (KLONOPIN) 0.5 MG tablet Take 1 tablet by mouth 2 times daily as needed for Anxiety for up to 90 days.  Yes Ozzie Tellez Ziggy Michael MD       Social History     Tobacco Use    Smoking status: Current Every Day Smoker     Packs/day: 0.50     Types: Cigarettes    Smokeless tobacco: Never Used   Substance Use Topics    Alcohol use: No     Alcohol/week: 0.0 standard drinks    Drug use: Not on file            PHYSICAL EXAMINATION:  Vital Signs: (As obtained by patient/caregiver or practitioner observation)    Blood pressure-  Heart rate-    Respiratory rate-    Temperature-  Pulse oximetry-     Constitutional: Appears well-developed and well-nourished     No apparent distress             [] Abnormal-   Mental status:      Alert and awake       Oriented to person/place/time      Able to follow commands              [] Abnormal   Eyes:  EOM     Normal         Sclera  Normal               Discharge None visible             [] Abnormal-   HENT:      Normocephalic, atraumatic. Mucous membranes are moist.             [] Abnormal   External Ears:      Normal              [] Abnormal-  Neck:                No visualized mass              [] Abnormal-  Pulmonary/Chest:Respiratory effort normal.           No visualized signs of difficulty breathing or respiratory distress         [] Abnormal-    Musculoskeletal: Normal gait with no signs of ataxia       Normal range of motion of neck         [] Abnormal-   Neurological:   No Facial Asymmetry (Cranial nerve 7 motor function) (limited exam to video visit)      No gaze palsy          [] Abnormal-       Skin:    No significant exanthematous lesions or discoloration noted on facial skin           [] Abnormal-         Psychiatric:  Normal Affect      No Hallucinations          [] Abnormal-       Other pertinent observable physical exam findings-     ASSESSMENT/PLAN:  1. Anxiety  Doing well. We will lower the frequency down to twice daily,  With his goal of becoming less dependent on the medication. He has slowly been working on this over time. Call with problems.   - clonazePAM (KLONOPIN) 0.5 MG tablet; Take 1 tablet by mouth 2 times daily as needed for Anxiety for up to 90 days. Dispense: 60 tablet; Refill: 2      Return in about 3 months (around 5/28/2022). Mack Quarles, was evaluated through a synchronous (real-time) audio-video encounter. The patient (or guardian if applicable) is aware that this is a billable service, which includes applicable co-pays. This Virtual Visit was conducted with patient's (and/or legal guardian's) consent. The visit was conducted pursuant to the emergency declaration under the [address] [place] [address], [address] [place] [address] authority and the [employer] General Act. Patient identification was verified, and a caregiver was present when appropriate. The patient was located at home in a state where the provider was licensed to provide care. Total time spent on this encounter: Not billed by time    --Darshan Bentley MD on 2/28/2022 at 5:04 PM    An electronic signature was used to authenticate this note.

## 2022-07-18 DIAGNOSIS — F41.9 ANXIETY: ICD-10-CM

## 2022-07-18 RX ORDER — CLONAZEPAM 0.5 MG/1
0.5 TABLET ORAL 2 TIMES DAILY PRN
Qty: 60 TABLET | Refills: 2 | Status: SHIPPED
Start: 2022-07-18 | End: 2022-09-26 | Stop reason: SDUPTHER

## 2022-07-19 NOTE — TELEPHONE ENCOUNTER
Ignacio Bass informed and made an appt. He states he just had a few pill sleft over from his last script.

## 2022-09-26 ENCOUNTER — OFFICE VISIT (OUTPATIENT)
Dept: FAMILY MEDICINE CLINIC | Age: 42
End: 2022-09-26
Payer: COMMERCIAL

## 2022-09-26 VITALS
DIASTOLIC BLOOD PRESSURE: 88 MMHG | SYSTOLIC BLOOD PRESSURE: 132 MMHG | OXYGEN SATURATION: 98 % | HEIGHT: 72 IN | BODY MASS INDEX: 31.83 KG/M2 | HEART RATE: 95 BPM | WEIGHT: 235 LBS | TEMPERATURE: 97.9 F

## 2022-09-26 DIAGNOSIS — R79.89 ELEVATED LFTS: ICD-10-CM

## 2022-09-26 DIAGNOSIS — F41.9 ANXIETY: ICD-10-CM

## 2022-09-26 PROCEDURE — 99213 OFFICE O/P EST LOW 20 MIN: CPT | Performed by: FAMILY MEDICINE

## 2022-09-26 RX ORDER — CLONAZEPAM 0.5 MG/1
0.5 TABLET ORAL 2 TIMES DAILY PRN
Qty: 60 TABLET | Refills: 2 | Status: SHIPPED | OUTPATIENT
Start: 2022-09-26 | End: 2022-12-25

## 2022-09-26 NOTE — PROGRESS NOTES
[facility]  Office Progress Note - Dr. Vero Goel  9/26/22    CC:   Chief Complaint   Patient presents with    Anxiety        /88   Pulse 95   Temp 97.9 °F (36.6 °C) (Temporal)   Ht 6' (1.829 m)   Wt 235 lb (106.6 kg)   SpO2 98%   BMI 31.87 kg/m²   Wt Readings from Last 3 Encounters:   09/26/22 235 lb (106.6 kg)   11/22/21 234 lb (106.1 kg)   06/10/20 223 lb 12.8 oz (101.5 kg)       HPI: anxiety  Has been good  No real issues  Has continued on klonopin but we've lowered the dose over time. Feels like decreasing the med further would be difficult. 0.25 in the morning when wakes, and another around 10, and another around 12:30, and may be last 2:30 or so- so 1mg daily total.   Not affecting interactions with work or people. No feelings of craving the next pill. [place] report reviewed and consistent. Urine drug screen performed today does not have any abnormalities, though at this lower dose and with taking the medication long-term the screening test may not be detecting it. We will get confirmatory testing if possible.  _________________________________________________________    Assessment / Ray Klinefelter was seen today for anxiety. Diagnoses and all orders for this visit:    Anxiety  -     clonazePAM (KLONOPIN) 0.5 MG tablet; Take 1 tablet by mouth 2 times daily as needed for Anxiety for up to 90 days. -     Urine Drug Screen; Future  3-month check-in on controlled substance prescribing. Doing well. Stable. No worrisome findings. Working for him. Benefits continued outweigh risks. He wishes to continue without change. Everything consistent. We will do so. Return in about 3 months (around 12/26/2022). or as scheduled. Patient counseled to follow up sooner or seek more acute care if symptoms worsening or not improving according to plan.      Electronically signed by Carla Chahal MD on 9/27/2022    _________________________________________________________  No current outpatient medications on file prior to visit. No current facility-administered medications on file prior to visit. Patient Active Problem List   Diagnosis Code    Anxiety F41.9     _________________________________________________________  Past Medical History:   Diagnosis Date    Anxiety        Family History   Problem Relation Age of Onset    Diabetes Father     High Cholesterol Father        Past Surgical History:   Procedure Laterality Date    ACHILLES TENDON SURGERY      HAND SURGERY      Right 5th metacrpal plate    TONSILLECTOMY AND ADENOIDECTOMY         Social History     Tobacco Use    Smoking status: Every Day     Packs/day: 0.50     Types: Cigarettes    Smokeless tobacco: Never   Substance Use Topics    Alcohol use: No     Alcohol/week: 0.0 standard drinks       Chart reviewed and updated where appropriate for PMH, Fam, and Soc Hx.  _________________________________________________________  ROS: POSITIVE: As in the HPI. Otherwise Pertinent negatives are negative.    __________________________________________________________  Physical Exam   Constitutional:    He is oriented to person, place, and time. He appears well-developed and well-nourished. Eyes:    Conjunctivae are normal.    Pupils are equal, round, and reactive to light. EOMI. Neck:    Normal range of motion. No thyromegaly or nodules noted. No bruit. No LAD. Cardiovascular:    Normal rate, regular rhythm and normal heart sounds. No murmur. No gallop and no friction rub. Pulmonary/Chest:    Effort normal and breath sounds normal.    No wheezes. No rales or rhonchi. Skin:    Skin is warm and dry. No rashes, lesions. Psychiatric:    He has a normal mood and affect. Normal groom and dress. No SI or HI.   ________________________________________________________    This note may have been created using dictation software. Efforts were made to reduce errors, but some may persist.

## 2022-09-28 ENCOUNTER — TELEPHONE (OUTPATIENT)
Dept: FAMILY MEDICINE CLINIC | Age: 42
End: 2022-09-28

## 2022-09-28 DIAGNOSIS — F41.9 ANXIETY: Primary | ICD-10-CM

## 2022-09-28 NOTE — TELEPHONE ENCOUNTER
Per Dr Nicki Candelario:    Please have lab add on confirmation testing for benzodiazepines. [address] [facility] said to place the order and they will make sure it is run.

## 2022-09-29 ENCOUNTER — TELEPHONE (OUTPATIENT)
Dept: FAMILY MEDICINE CLINIC | Age: 42
End: 2022-09-29

## 2022-09-29 DIAGNOSIS — F41.9 ANXIETY: ICD-10-CM

## 2022-09-29 LAB
INTEGRITY CHECK, CREATININE, URINE: 174
INTEGRITY CHECK, OXIDANT, URINE: <40
INTEGRITY CHECK, PH, URINE: 7.1 (ref 4.5–9)
INTEGRITY CHECK, SPECIFIC GRAVITY, URINE: 1.02 (ref 1–1.03)
INTEGRITY CHECK, SPECIMEN INTEGRITY, URINE: NORMAL

## 2022-09-29 NOTE — TELEPHONE ENCOUNTER
[facility] lab called b/c they did not receive the lab draw from 9/26/22. The lab in [facility] confirmed that they did draw blood and collected a urine. They have a supervisor looking into this. They are trying to locate the blood sample. At this time patient does not need to come in to do another blood draw. They believe they can still run the sample once it is located.

## 2022-09-29 NOTE — TELEPHONE ENCOUNTER
Patient DID tell me on day of visit before going to the lab that his intention was to only give urine sample and he would get his blood drawn another day. So might want to verify with patient whether he had blood drawn or not.

## 2022-09-30 LAB
7-AMINOCLONAZEPAM, QUANTITATIVE, URINE: 375
ALPHA-HYDROXYALPRAZOLAM, QUANTITATIVE, URINE: <50
ALPHA-HYDROXYMIDAZOLAM, QUANTITATIVE, URINE: <50
ALPHA-HYDROXYTRIAZOLAM, QUANTITATIVE, URINE: <50
ALPRAZOLAM URINE QUANT: <50
CHLORDIAZEPOXIDE, QUANTITATIVE, URINE: <50
CLONAZEPAM, QUANTITATIVE, URINE: <50
COMMENT: NORMAL
DIAZEPAM URINE QUANT: <50
FLUNITRAZEPAM, QUANTITATIVE, URINE: <50
FLURAZEPAM, QUANTITATIVE, URINE: <50
LORAZEPAM URINE QUANT: <50
MIDAZOLAM URINE QUANT: <50
NORDIAZEPAM URINE QUANT: <50
OXAZEPAM URINE QUANT: <50
TEMAZEPAM, QUANTITATIVE, URINE: <50

## 2023-01-10 DIAGNOSIS — F41.9 ANXIETY: ICD-10-CM

## 2023-01-10 RX ORDER — CLONAZEPAM 0.5 MG/1
0.5 TABLET ORAL 2 TIMES DAILY PRN
Qty: 60 TABLET | Refills: 0 | Status: SHIPPED | OUTPATIENT
Start: 2023-01-10 | End: 2023-04-10